# Patient Record
Sex: FEMALE | Race: WHITE | URBAN - METROPOLITAN AREA
[De-identification: names, ages, dates, MRNs, and addresses within clinical notes are randomized per-mention and may not be internally consistent; named-entity substitution may affect disease eponyms.]

---

## 2018-02-13 ENCOUNTER — OFFICE VISIT (OUTPATIENT)
Dept: RHEUMATOLOGY | Age: 33
End: 2018-02-13

## 2018-02-13 VITALS
SYSTOLIC BLOOD PRESSURE: 120 MMHG | TEMPERATURE: 98.5 F | DIASTOLIC BLOOD PRESSURE: 72 MMHG | WEIGHT: 123 LBS | HEIGHT: 66 IN | BODY MASS INDEX: 19.77 KG/M2

## 2018-02-13 DIAGNOSIS — M79.7 FIBROMYALGIA: Primary | ICD-10-CM

## 2018-02-13 DIAGNOSIS — Z13.89 SCREENING FOR RHEUMATIC DISORDER: ICD-10-CM

## 2018-02-13 DIAGNOSIS — M79.7 FIBROMYALGIA: ICD-10-CM

## 2018-02-13 LAB
TOTAL CK: 92 U/L (ref 26–192)
VITAMIN D 25-HYDROXY: 25.1 NG/ML

## 2018-02-13 PROCEDURE — 99244 OFF/OP CNSLTJ NEW/EST MOD 40: CPT | Performed by: INTERNAL MEDICINE

## 2018-02-13 RX ORDER — TIZANIDINE 2 MG/1
TABLET ORAL
Qty: 30 TABLET | Refills: 0 | Status: SHIPPED | OUTPATIENT
Start: 2018-02-13 | End: 2018-03-12 | Stop reason: SDUPTHER

## 2018-02-13 NOTE — PATIENT INSTRUCTIONS
weight gain. Though not recommended as the first treatment, tramadol (Ultram) may be used to treat fibromyalgia pain. This painkiller is an opioid narcotic. Doctors do not suggest using other opioids for treating fibromyalgia. This is not because of fears of dependence. Rather, evidence suggests these drugs are not of great benefit to most people with fibromyalgia. In fact, they may cause greater pain sensitivity or make pain persist.   In some cases, fibromyalgia pain can improve with use of over-the-counter medicines such as acetaminophen (Tylenol) or nonsteroidal anti-inflammatory drugs (commonly called NSAIDs) like ibuprofen (Advil, Motrin) or naproxen (Aleve, Anaprox). Yet, these drugs likely treat the pain triggers, rather than the fibromyalgia pain itself. Thus, they are most useful in people who have other causes for pain such as arthritis. For sleep problems, some of the medicines that treat pain also improve sleep. These include cyclobenzaprine (Flexeril), amitriptyline (Elavil), gabapentin (Neurontin) or pregabalin (Lyrica). It is not recommended that patients with fibromyalgia take sleeping medicines like zolpidem (Ambien) or benzodiazepine medications. Other Therapies: People with fibromyalgia should use non-drug treatments as well as any medicines their doctors suggest. Research shows that gentle body-based therapies including Philip Chi and yoga can ease fibromyalgia symptoms. Cognitive behavioral therapy (a type of therapy focused on behavior change and positive thinking) can help redefine your illness beliefs. Also, through learning symptom reduction skills, you can change your behavioral response to pain. Other complementary and alternative therapies (sometimes called CAM or integrative medicine), such as acupuncture, chiropractic and massage therapy, can be useful to manage fibromyalgia symptoms. Many of these treatments, though, have not been well tested in patients with fibromyalgia. Living with fibromyalgia  Even with the many treatment options, patient self-care is vital to improving symptoms and daily function. In concert with medical treatment, healthy lifestyle behaviors can reduce pain, increase sleep quality, lessen fatigue and help you cope better with fibromyalgia. Here are some self-care tips. Make time to relax each day. Deep-breathing exercises and meditation will help reduce the stress that can bring on symptoms. Set a regular sleep pattern. Go to bed and wake up at the same time each day. Getting enough sleep lets your body repair itself, physically and mentally. Also, avoid daytime napping and limit caffeine intake, which can disrupt sleep. Nicotine is a stimulant, so those fibromyalgia patients with sleep problems should stop smoking. Exercise often. This is a very important part of fibromyalgia treatment. While difficult at first, regular exercise often reduces pain symptoms and fatigue. Patients should follow the saying, \"Start low, go slow. \" Slowly add daily fitness into your routine. For instance, take the stairs instead of the elevator, or park further away from the store. After awhile, do more physical activity. Add in some walking, swimming, water aerobics and/or stretching exercises. It takes time to create a comfortable routine. Just get moving, stay active and don't give up! Educate yourself. Nationally recognized organizations like the [facility] are great resources for information. Share this information with family, friends and co-workers. Points to remember  Look forward, not backward. Focus on what you need to do to get better, not what caused your illness. As your symptoms decrease with drug treatments, start increasing your activity. Begin to do things that you stopped doing because of your pain and other symptoms.    With proper treatment and self-care, you can get better and live a normal life.  The role of the rheumatologist  Fibromyalgia is not a form of arthritis (joint disease). It does not cause inflammation or damage to joints, muscles or other tissues. However, because fibromyalgia can cause chronic pain and fatigue similar to arthritis, some people may think of it as a rheumatic condition. As a result, often a rheumatologist detects this disease (and rules out other rheumatic diseases). Your primary care physician can provide all the other care and treatment of fibromyalgia that you need. To find a rheumatologist  For a listing of rheumatologists in your area, click here. For more information about rheumatologists click here. [employer]   www. fmnetnews. [address] of Arthritis and Musculoskeletal and Skin Diseases  www.niams. nih.gov/hi/topics/fibromyalgia/fibrofs. htm   National Fibromyalgia Association   www. fmaware. [address] fmpartnership.org   Sahra [email or web address]. [email or web address]

## 2018-02-13 NOTE — PROGRESS NOTES
muscle relaxant- tizanidine p.r.n. We discussed about the benefit from physical reconditioning. She states that she is not a big pill taker, would like to stick with muscle relaxant and physical reconditioning as long as it does not damage her organ. Reading information was also provided. Follow-up in 3 months. Patient indicates understanding and agrees with the management plan. I reviewed patient's history, referral documents and electronic medical records. Copy of consult note is being routed electronically/faxed to referring physician. #######################################################################    REASON FOR CONSULTATION:  Patient is being seen at the request of  Jeffery Velázquez MD for evaluation of chronic musculoskeletal discomfort and fatigue. HISTORY OF PRESENT ILLNESS:  29 y.o. female has been experiencing musculoskeletal discomfort for last 10-15 years, describes it as dull to sharp pain, all muscles are affected in hands, back, sternum, upper and lower extremity, achy, sore, worse at the end of the day, associated with extreme fatigue. She states that she gets wiped out at the end of the day after she comes home from work. Symptoms have gotten progressively worse over the period of time. She also reports weakness in her finger . She goes to yoga class III times a week, that has to stretch her muscles, however fatigue is persistent. She also battles with sleep, has poor quality of sleep. She denies any snoring. She does not have any swollen or inflamed joints. No psoriasis, inflammatory bowel diseases. Over-the-counter NSAIDs provide marginally. Blood work reviewed-normal inflammatory markers, negative RANDALL. Pertinent ROS: Denies weight loss, objective fever, skin rashes or skin thickening, photosensitivity Raynauds, focal alopecia, recurrent ocular congestion, dry eyes or mouth, or mucosal ulcers, tinnitus or recent hearing loss.  Denies chest pain, palpitations,

## 2018-02-24 ENCOUNTER — TELEPHONE (OUTPATIENT)
Dept: OTHER | Facility: CLINIC | Age: 33
End: 2018-02-24

## 2018-03-08 ENCOUNTER — TELEPHONE (OUTPATIENT)
Dept: OTHER | Facility: CLINIC | Age: 33
End: 2018-03-08

## 2018-03-09 ENCOUNTER — EMPLOYEE WELLNESS (OUTPATIENT)
Dept: OTHER | Age: 33
End: 2018-03-09

## 2018-04-16 VITALS — WEIGHT: 119 LBS | BODY MASS INDEX: 19.21 KG/M2

## 2018-07-11 ENCOUNTER — TELEPHONE (OUTPATIENT)
Dept: RHEUMATOLOGY | Age: 33
End: 2018-07-11

## 2018-07-11 NOTE — TELEPHONE ENCOUNTER
LMOM advised pt medication was e-scribed to pharmacy and call us back with any other questions or concerns

## 2018-07-11 NOTE — TELEPHONE ENCOUNTER
Pt calling to stated the Tizanidine medication isn't working for her Fibromyalgia pain. Pt is requesting the [address] [facility], in which was discussed during OV be prescribed.  Pt can be reached at [phone]    Please Advise

## 2018-08-01 ENCOUNTER — TELEPHONE (OUTPATIENT)
Dept: RHEUMATOLOGY | Age: 33
End: 2018-08-01

## 2019-04-24 ENCOUNTER — EMPLOYEE WELLNESS (OUTPATIENT)
Dept: OTHER | Age: 34
End: 2019-04-24

## 2019-04-29 VITALS — BODY MASS INDEX: 20.18 KG/M2 | WEIGHT: 125 LBS
